# Patient Record
Sex: FEMALE | Race: BLACK OR AFRICAN AMERICAN | NOT HISPANIC OR LATINO | Employment: PART TIME | URBAN - METROPOLITAN AREA
[De-identification: names, ages, dates, MRNs, and addresses within clinical notes are randomized per-mention and may not be internally consistent; named-entity substitution may affect disease eponyms.]

---

## 2017-01-07 ENCOUNTER — ALLSCRIPTS OFFICE VISIT (OUTPATIENT)
Dept: OTHER | Facility: OTHER | Age: 27
End: 2017-01-07

## 2017-01-09 ENCOUNTER — GENERIC CONVERSION - ENCOUNTER (OUTPATIENT)
Dept: OTHER | Facility: OTHER | Age: 27
End: 2017-01-09

## 2017-01-09 LAB
CULTURE RESULT (HISTORICAL): NORMAL
MISCELLANEOUS LAB TEST RESULT (HISTORICAL): NORMAL

## 2017-01-27 ENCOUNTER — ALLSCRIPTS OFFICE VISIT (OUTPATIENT)
Dept: OTHER | Facility: OTHER | Age: 27
End: 2017-01-27

## 2017-01-27 DIAGNOSIS — M54.9 DORSALGIA: ICD-10-CM

## 2017-03-10 ENCOUNTER — GENERIC CONVERSION - ENCOUNTER (OUTPATIENT)
Dept: OTHER | Facility: OTHER | Age: 27
End: 2017-03-10

## 2017-03-10 ENCOUNTER — ALLSCRIPTS OFFICE VISIT (OUTPATIENT)
Dept: OTHER | Facility: OTHER | Age: 27
End: 2017-03-10

## 2017-03-15 LAB
ADEQUACY: (HISTORICAL): NORMAL
CLINICIAN PROVIDIED ICD 9 OR 10 (HISTORICAL): NORMAL
COMMENT (HISTORICAL): NORMAL
DIAGNOSIS (HISTORICAL): NORMAL
HPV HIGH RISK (HISTORICAL): NEGATIVE
NOTE: (HISTORICAL): NORMAL
PERFORMED BY (HISTORICAL): NORMAL

## 2017-03-17 ENCOUNTER — GENERIC CONVERSION - ENCOUNTER (OUTPATIENT)
Dept: OTHER | Facility: OTHER | Age: 27
End: 2017-03-17

## 2017-12-05 ENCOUNTER — GENERIC CONVERSION - ENCOUNTER (OUTPATIENT)
Dept: OTHER | Facility: OTHER | Age: 27
End: 2017-12-05

## 2017-12-05 ENCOUNTER — ALLSCRIPTS OFFICE VISIT (OUTPATIENT)
Dept: OTHER | Facility: OTHER | Age: 27
End: 2017-12-05

## 2017-12-15 ENCOUNTER — GENERIC CONVERSION - ENCOUNTER (OUTPATIENT)
Dept: OTHER | Facility: OTHER | Age: 27
End: 2017-12-15

## 2018-01-11 NOTE — RESULT NOTES
Verified Results  (1) THROAT CULTURE (CULTURE, UPPER RESPIRATORY) [id number] 12:00AM Rolly Jones     Test Name Result Flag Reference   Upper Respiratory Culture Final report     Result 1 Comment     Routine respiratory chelita

## 2018-01-12 VITALS
SYSTOLIC BLOOD PRESSURE: 120 MMHG | HEIGHT: 64 IN | DIASTOLIC BLOOD PRESSURE: 76 MMHG | TEMPERATURE: 98 F | WEIGHT: 155.8 LBS | BODY MASS INDEX: 26.6 KG/M2 | HEART RATE: 64 BPM

## 2018-01-14 VITALS
DIASTOLIC BLOOD PRESSURE: 78 MMHG | BODY MASS INDEX: 26.12 KG/M2 | SYSTOLIC BLOOD PRESSURE: 116 MMHG | HEIGHT: 64 IN | WEIGHT: 153 LBS

## 2018-01-14 NOTE — PROGRESS NOTES
Assessment    1  Environmental allergies (V15 09) (Z91 09)   2  Thyroid enlargement (240 9) (E04 9)   3  Encounter for preventive health examination (V70 0) (Z00 00)   4  Paresthesia (782 0) (R20 2)    Plan  Environmental allergies    · Fluticasone Propionate 50 MCG/ACT Nasal Suspension; 2  spray inhale in each  nostril daily   · Montelukast Sodium 10 MG Oral Tablet (Singulair); 1 every day  Health Maintenance, Environmental allergies, Thyroid enlargement    · (1) CBC/PLT/DIFF; Status:Active; Requested for:29Apr2016;    · (1) COMPREHENSIVE METABOLIC PANEL; Status:Active; Requested for:29Apr2016;    · (1) T3 TOTAL; Status:Active; Requested for:29Apr2016;    · (1) T4, FREE; Status:Active; Requested for:29Apr2016;    · (1) TSH; Status:Active; Requested for:29Apr2016;   Paresthesia    · *(Q) VITAMIN D, 25-HYDROXY, LC/MS/MS; Status:Active; Requested for:29Apr2016; Thyroid enlargement    · US THYROID; Status:Hold For - Scheduling; Requested for:29Apr2016;     Discussion/Summary  health maintenance visit healthy adult female Currently, she eats a healthy diet and has an adequate exercise regimen  cervical cancer screening is current Breast cancer screening: monthly self breast exam was advised  - Generally healthy 63-year-old female  - Mild diffuse enlargement of thyroid gland  No discrete thyroid nodules palpated  Patient will be sent for thyroid ultrasound  -Patient does have transient paresthesias of her fingers which may be an element of carpal tunnel syndrome  Will check thyroid function studies  -Patient given order for screening CBC, CMP, TSH, lipid profile and vitamin D level  -Patient does have environmental allergies  She was given a prescription for Singulair 10 mg once daily and Flonase nasal spray  - We will send  with results of lab studies and thyroid ultrasound barring any significant abnormalities  Possible side effects of new medications were reviewed with the patient/guardian today     The patient was counseled regarding instructions for management  Chief Complaint  NP Preventative visit  History of Present Illness  HM, Adult Female: The patient is being seen for a health maintenance evaluation  General Health: The patient's health since the last visit is described as good  She has regular dental visits  She denies vision problems  She denies hearing loss  Immunizations status: up to date  Lifestyle:  She consumes a diverse and healthy diet  She does not have any weight concerns  She exercises regularly  She does not use tobacco  She denies alcohol use  She denies drug use  Reproductive health:  she reports normal menses  Screening: cancer screening reviewed and current  metabolic screening reviewed and current  risk screening reviewed and current  HPI: 63-year-old -American female presents as a new patient to the practice to establish care  Patient does complain of intermittent episodes of paresthesias in her index and middle finger of both hands as well as on her toes  She is generally in good health  Patient does complain of nasal allergy symptoms  She recently moved to the area from [place]  She will likely be moving down to the [place] area as her  works at the [employer]  Review of Systems    Constitutional: No fever, no chills, feels well, no tiredness, no recent weight gain or weight loss  Eyes: No complaints of eye pain, no red eyes, no eyesight problems, no discharge, no dry eyes, no itching of eyes  ENT: nasal discharge  Cardiovascular: No complaints of slow heart rate, no fast heart rate, no chest pain, no palpitations, no leg claudication, no lower extremity edema  Respiratory: No complaints of shortness of breath, no wheezing, no cough, no SOB on exertion, no orthopnea, no PND  Gastrointestinal: No complaints of abdominal pain, no constipation, no nausea or vomiting, no diarrhea, no bloody stools     Genitourinary: No complaints of dysuria, no incontinence, no pelvic pain, no dysmenorrhea, no vaginal discharge or bleeding  Musculoskeletal: No complaints of arthralgias, no myalgias, no joint swelling or stiffness, no limb pain or swelling  Integumentary: No complaints of skin rash or lesions, no itching, no skin wounds, no breast pain or lump  Neurological: tingling, but as noted in HPI  Psychiatric: Not suicidal, no sleep disturbance, no anxiety or depression, no change in personality, no emotional problems  Endocrine: No complaints of proptosis, no hot flashes, no muscle weakness, no deepening of the voice, no feelings of weakness  Hematologic/Lymphatic: No complaints of swollen glands, no swollen glands in the neck, does not bleed easily, does not bruise easily  Family History  Mother    · Family history of hypertension (V17 49) (Z82 49)    Social History    · Caffeine use (V49 89) (F15 90)   · Coffee   ·    · Never smoker   · Occupation   ·    · One child    Current Meds   1  No Reported Medications Recorded    Allergies    1  No Known Drug Allergies    Vitals   Recorded: 29Apr2016 11:10AM   Heart Rate 68   Respiration 14   Systolic 340   Diastolic 72   Height 5 ft 4 in   Weight 154 lb    BMI Calculated 26 43   BSA Calculated 1 76     Physical Exam    Constitutional   General appearance: No acute distress, well appearing and well nourished  Eyes   Conjunctiva and lids: No swelling, erythema or discharge  Pupils and irises: Equal, round, reactive to light  Ophthalmoscopic examination: Normal fundi and optic discs  Ears, Nose, Mouth, and Throat   External inspection of ears and nose: Normal     Otoscopic examination: Tympanic membranes translucent with normal light reflex  Canals patent without erythema  Hearing: Normal     Nasal mucosa, septum, and turbinates: Abnormal   There was clear rhinorrhea from both nares  The bilateral nasal mucosa was boggy, edematous and red  Lips, teeth, and gums: Normal, good dentition  Oropharynx: Normal with no erythema, edema, exudate or lesions  Neck   Neck: Supple, symmetric, trachea midline, no masses  Thyroid: Abnormal   The thyroid was diffusely enlarged  Pulmonary   Respiratory effort: No increased work of breathing or signs of respiratory distress  Percussion of chest: Normal     Palpation of chest: Normal     Auscultation of lungs: Clear to auscultation  Cardiovascular   Palpation of heart: Normal PMI, no thrills  Auscultation of heart: Normal rate and rhythm, normal S1 and S2, no murmurs  Carotid pulses: 2+ bilaterally  Abdominal aorta: Normal     Femoral pulses: 2+ bilaterally  Pedal pulses: 2+ bilaterally  Examination of extremities for edema and/or varicosities: Normal     Abdomen   Abdomen: Non-tender, no masses  Liver and spleen: No hepatomegaly or splenomegaly  Examination for hernias: No hernia appreciated  Lymphatic   Palpation of lymph nodes in neck: No lymphadenopathy  Palpation of lymph nodes in axillae: No lymphadenopathy  Palpation of lymph nodes in groin: No lymphadenopathy  Palpation of lymph nodes in other areas: No lymphadenopathy  Musculoskeletal   Gait and station: Normal     Digits and nails: Normal without clubbing or cyanosis  Joints, bones, and muscles: Normal     Range of motion: Normal     Stability: Normal     Muscle strength/tone: Normal     Skin   Skin and subcutaneous tissue: Normal without rashes or lesions  Palpation of skin and subcutaneous tissue: Normal turgor  Neurologic   Cranial nerves: Cranial nerves II-XII intact  Reflexes: 2+ and symmetric  Sensation: No sensory loss  Psychiatric   Judgment and insight: Normal     Orientation to person, place, and time: Normal     Recent and remote memory: Intact  Mood and affect: Normal        Signatures   Electronically signed by : Leah Howell DO;  Apr 29 2016  3:47PM EST (Author)

## 2018-01-15 NOTE — PROGRESS NOTES
Assessment    1  Encounter for gynecological examination without abnormal finding (V72 31) (Z01 419)   2  Allergic rhinitis (477 9) (J30 9)    Plan   Allergic rhinitis    · Fexofenadine HCl - 180 MG Oral Tablet; TAKE 1 TABLET DAILY AS NEEDED FOR  ALLERGIES   Rx By: Moreno Bailey; Dispense: 30 Days ; #:30 Tablet; Refill: 2; For: Allergic rhinitis; RAYMUNDO = N; Verified Transmission to [facility]; Last Updated By: System, [employer]; 3/10/2017 10:47:51 AM   · Fluticasone Propionate 50 MCG/ACT Nasal Suspension; 2 sprays in each nostril at  bedtime   Rx By: Moreno Bailey; Dispense: 0 Days ; #:1 X 16 GM Bottle; Refill: 3; For: Allergic rhinitis; RAYMUNDO = N; Verified Transmission to [facility]; Last Updated By: System, [employer]; 3/10/2017 10:47:52 AM  Encounter for gynecological examination without abnormal finding    · Drink plenty of fluids ; Status:Complete;   Done: 05SJR7737   Ordered;  For:Encounter for gynecological examination without abnormal finding; Ordered By:Anna Nickerson;   · Eat a normal well-balanced diet ; Status:Complete;   Done: 12OHJ3524   Ordered;  For:Encounter for gynecological examination without abnormal finding; Ordered By:Anna Nickerson;   · Eat foods that are high in calcium ; Status:Complete;   Done: 60ZEA1199   Ordered;  For:Encounter for gynecological examination without abnormal finding; Ordered By:Anna Nickerson;   · There are many ways to reduce your risk of catching or spreading a sexually transmitted  Infection ; Status:Complete;   Done: 70RGW5176   Ordered;  Monet Eduardo for gynecological examination without abnormal finding; Ordered By:Anna Nickerson;   · Use a sun block product with an SPF of 15 or more ; Status:Complete;   Done:  74ZLJ5612   Ordered;  For:Encounter for gynecological examination without abnormal finding; Ordered By:Anna Nickerson;   · Using a latex condom can help prevent pregnancy   It can also help to prevent the spread  of sexually transmitted infections ; Status:Complete;   Done: 35GWL2584   Ordered;  For:Encounter for gynecological examination without abnormal finding; Ordered By:Jenny Nickerson;   · Vitamins can help you get daily requirements that your diet may not be giving you ;  Status:Complete;   Done: 36ICP4708   Ordered;  For:Encounter for gynecological examination without abnormal finding; Ordered By:Jenny Nickerson;   · We encourage all of our patients to exercise regularly  30 minutes of exercise or physical  activity five or more days a week is recommended for children and adults ;  Status:Complete;   Done: 55LHW2831   Ordered;  For:Encounter for gynecological examination without abnormal finding; Ordered By:Jenny Nickerson;   · We recommend that you follow these steps to lower your risk of osteoporosis  ;  Status:Complete;   Done: 62KUQ3237   Ordered;  For:Encounter for gynecological examination without abnormal finding; Ordered By:Jenny Nickerson;   · Call [phone] if: You find a new or different kind of lump in your breast ;  Status:Complete;   Done: 09SER7803   Ordered;  For:Encounter for gynecological examination without abnormal finding; Ordered By:Jenny Nickerson;   · Call [phone] if: You have any warning signs of skin cancer ; Status:Complete;    Done: 45DDM4872   Ordered;  For:Encounter for gynecological examination without abnormal finding; Ordered By:Jenny Nickerson;   · (1) THIN PREP PAP WITH IMAGING; Status:Active; Requested for:10Mar2017;    Perform:[facility]; Due:10Mar2018; Ordered;  For:Encounter for gynecological examination without abnormal finding; Ordered By:Jenny Nickerson; Maturation index required? : No  HPV? : Regardless of Interpretation    Follow-up visit in 1 year Evaluation and Treatment  Follow-up  Status: Hold For - Scheduling  Requested for: 85OXE5390  Ordered;    For: Encounter for gynecological examination without abnormal finding;  Ordered By: Cristian Ocasio  Performed: Due: 67PHW1599     Discussion/Summary  healthy adult female the risks and benefits of cervical cancer screening were discussed HPV and Pap Co-testing Done Today Breast cancer screening: the risks and benefits of breast cancer screening were discussed, self breast exam technique was taught, monthly self breast exam was advised and the next mammogram is due at age 36  Colorectal cancer screening: colorectal cancer screening is not indicated  Osteoporosis screening: bone mineral density testing is not indicated  Advice and education were given regarding reproductive health and contraception  Patient discussion: discussed with the patient  Gyn exam w/pap and HPV updated today  If HPV negative, next can be done in 3 years  Return in 1 year for CBE/pelvic exam    Scripts issued for antihistamine and nasal steroid as pt requested for upcoming spring allergy season  Patient is able to Self-Care  The treatment plan was reviewed with the patient/guardian  The patient/guardian understands and agrees with the treatment plan      Chief Complaint  gyn exam        History of Present Illness  HPI: Here to update gyn visit today  Last gyn visit was about a year ago in [place]  No hx abnormals  Denies gyn questions/concerns today  PMH - vaginosis when pregnant  PSH- no gyn surgeries  FH - maternal great aunt, breast cancer in 68s-80  pregnancy - vaginal birth 1 (son Diego Hudson), P4 - 2 other vaginal births but wishes to not discuss, is not raising those children  FDLMP - 3/3/17, regular monthly menses, 1 heavy day and spotting x4 days  birth control - nothing, off OCPs for few months     GYN HM, Adult Female [facility]: The patient is being seen for a gynecology evaluation  The last health maintenance visit was 1 year(s) ago  Social History: Household members include spouse and 1 son(s)  She is   Work status: working part-time and occupation: sales at [employer]   General Health:  The patient's health since the last visit is described as good  Reproductive health: the patient is premenopausal   she reports no menstrual problems  she uses no contraception  she is sexually active  She is monogamous with a male partner  pregnancy history: G 3P 3  Screening: Cervical cancer screening includes a pap smear performed @1 year ago  and uncertain timing of her last human papilloma virus screening  Breast cancer screening includes no previous mammogram and monthly breast self-exams performed  Colorectal cancer screening includes no previous colonoscopy  Review of Systems  no pelvic pain, no pelvic pressure, no vaginal pain, no vaginal discharge, did not miss the most recent period, no dysmenorrhea, no dysuria and no urinary loss of control  Constitutional: not feeling poorly  Breasts: no breast pain and no breast lump  Gastrointestinal: no constipation and no blood in stools  Genitourinary: as noted in HPI  OB History  Pregnancy History (Brief):   Delivery type: 3 vaginal       Active Problems    1  Adenitis (289 3) (I88 9)   2  Chronic back pain (724 5,338 29) (M54 9,G89 29)   3  Environmental allergies (V15 09) (Z91 09)   4  Thyroid enlargement (240 9) (E01 0)   5  Vitamin D insufficiency (268 9) (E55 9)    Past Medical History    · History of Acute infective pharyngitis (462) (J02 9)   · History of Contraceptive management (V25 9) (Z30 9)   · History of lymphadenopathy (V13 89) (T05 408)   · History of Paresthesia (782 0) (R20 2)   · History of Sorethroat (462) (J02 9)    The active problems and past medical history were reviewed and updated today  Family History  Mother    · Family history of hypertension (V17 49) (Z82 49)    The family history was reviewed and updated today  Social History    · Caffeine use (V49 89) (F15 90)   · Coffee   ·    · Never smoker   · Occupation   ·    · One child  The social history was reviewed and updated today  Current Meds   1   Vitamin D3 1000 UNIT Oral Tablet; TAKE 1 TABLET DAILY; Therapy: 97SOX8726 to (Evaluate:03Svv3306) Recorded    Allergies    1  No Known Drug Allergies    Vitals   Recorded: 02HSS3331 10:21AM   Temperature 97 3 F   Heart Rate 84   Systolic 852   Diastolic 80   Height 5 ft 4 in   Weight 156 lb 9 6 oz   BMI Calculated 26 88   BSA Calculated 1 77     Physical Exam    Constitutional   General appearance: No acute distress, well appearing and well nourished  Genitourinary   External genitalia: Normal and no lesions appreciated  Vagina: Normal, no lesions or dryness appreciated  Cervix: Normal, no palpable masses  Examination of the cervix revealed a barrel-shaped cervix  A Pap smear was performed  Bimanual exam findings include no cervical motion tenderness  Uterus: Normal, non-tender, not enlarged, and no palpable masses  Adnexa/parametria: Normal, non-tender and no fullness or masses appreciated  Chest   Breasts: Normal and no dimpling or skin changes noted  Lymphatic   Palpation of lymph nodes in neck, axillae, groin and/or other locations: No lymphadenopathy or masses noted  Psychiatric   Mood and affect: Normal        Attending Note  Collaborating Physician Note: Collaborating Note: I agree with the Advanced Practitioner note  Future Appointments    Date/Time Provider Specialty Site   03/13/2018 11:00 AM Bora Nickerson MD     Signatures   Electronically signed by :  AIDA Cotto; Mar 10 2017  1:13PM EST                       (Author)    Electronically signed by : Ursula Murry MD; Mar 10 2017  2:33PM EST                       (Co-author)

## 2018-01-15 NOTE — RESULT NOTES
Message   Please notify patient that her pap is normal w/negative HPV so next can be obtained in 3 years  Verified Results  ([facility]) Pap IG, HPV-hr [id number] 12:00AM Sadie Fisher     Test Name Result Flag Reference   DIAGNOSIS: Comment     NEGATIVE FOR INTRAEPITHELIAL LESION AND MALIGNANCY  Specimen adequacy: Comment     Satisfactory for evaluation  Endocervical and/or squamous metaplastic  cells (endocervical component) are present  Clinician provided ICD10: Comment     Z01 419   Performed by: Gretel Young Cytotechnologist       Note: Comment     The Pap smear is a screening test designed to aid in the detection of  premalignant and malignant conditions of the uterine cervix  It is not a  diagnostic procedure and should not be used as the sole means of detecting  cervical cancer  Both false-positive and false-negative reports do occur  HPV, high-risk Negative  Negative   This high-risk HPV test detects thirteen high-risk types  (16/18/31/33/35/39/45/51/52/56/58/59/68) without differentiation            Patient notified of results

## 2018-01-16 ENCOUNTER — ALLSCRIPTS OFFICE VISIT (OUTPATIENT)
Dept: OTHER | Facility: OTHER | Age: 28
End: 2018-01-16

## 2018-01-18 NOTE — RESULT NOTES
Verified Results  US THYROID [id number] 04:42PM Luis Krueger Order Number: [id number]     Test Name Result Flag Reference   US THYROID (Report)     THYROID ULTRASOUND     INDICATION: Enlarged thyroid gland on clinical exam  Family history of heart carcinoma  COMPARISON: None  TECHNIQUE:  Ultrasound of the thyroid was performed with a high frequency linear transducer in transverse and sagittal planes including volumetric imaging sweeps as well as traditional still imaging technique  FINDINGS:   Normal homogeneous smooth echotexture  Right gland: 4 8 x 1 5 x 1 9 cm  No dominant nodules  Left gland: 3 8 x 1 2 x 1 6 cm  No dominant nodules  Isthmus: 0 2 cm in AP dimension  No dominant nodules  IMPRESSION:      Normal        Workstation performed: [id number]     Signed by:   Ladonna Anderson MD   5/13/16       Discussion/Summary   Ultrasound of the thyroid gland is completely normal  No thyroid nodules are noted  Please have labs completed as ordered

## 2018-01-22 VITALS
TEMPERATURE: 97.3 F | DIASTOLIC BLOOD PRESSURE: 80 MMHG | HEIGHT: 64 IN | BODY MASS INDEX: 26.73 KG/M2 | SYSTOLIC BLOOD PRESSURE: 110 MMHG | WEIGHT: 156.6 LBS | HEART RATE: 84 BPM

## 2018-01-23 ENCOUNTER — ALLSCRIPTS OFFICE VISIT (OUTPATIENT)
Dept: OTHER | Facility: OTHER | Age: 28
End: 2018-01-23

## 2018-01-23 NOTE — MISCELLANEOUS
Provider Comments  Provider Comments:   N/S FOR APT      Signatures   Electronically signed by : GUILLERMO Little ; Jan 16 2018  1:05PM EST                       (Author)

## 2018-01-24 VITALS
BODY MASS INDEX: 27.86 KG/M2 | DIASTOLIC BLOOD PRESSURE: 70 MMHG | WEIGHT: 157.25 LBS | HEIGHT: 63 IN | SYSTOLIC BLOOD PRESSURE: 118 MMHG

## 2018-01-24 VITALS
HEIGHT: 64 IN | WEIGHT: 155.25 LBS | SYSTOLIC BLOOD PRESSURE: 110 MMHG | BODY MASS INDEX: 26.5 KG/M2 | DIASTOLIC BLOOD PRESSURE: 60 MMHG

## 2018-01-24 NOTE — MISCELLANEOUS
Message  Return to work or school:   Kaleigh Wei is under my professional care  She was seen in my office on 1/23/18        Luis Lawler        Signatures   Electronically signed by : Luis Lawler; Jan 23 2018  3:03PM EST                       (Author)

## 2018-01-24 NOTE — PROGRESS NOTES
Assessment   1  Otitis externa of left ear (380 10) (H60 92)   2  Pregnancy (V22 2) (Z34 90)    Plan   Otitis externa of left ear    · Amoxicillin 500 MG Oral Capsule; TAKE 2 CAPSULES TWICE DAILY UNTIL GONE    Discussion/Summary      Remains with otitis externa despite drops  trial oral antibiotic   exam does not match amount of pain pt is having  Concern for possible neuralgia  pt to call with no improvement and will have her see ENT  Possible side effects of new medications were reviewed with the patient/guardian today  The treatment plan was reviewed with the patient/guardian  The patient/guardian understands and agrees with the treatment plan      Chief Complaint   Pt is here with c/o ear pain  History of Present Illness   HPI: Left ear pain that radiates down face  thinks it started after using a Q-tip  Was seen at [facility] and diagnosed with outer ear infection  using antibiotic eardrops  Pain is getting worse  taking Tylenol which helps but when wears off gets very bad  Denies nasal congestion and runny nose  Denies sore throat  4 months pregnant  Review of Systems        Constitutional: as noted in HPI       ENT: as noted in HPI  Active Problems   1  Adenitis (289 3) (I88 9)   2  Allergic rhinitis (477 9) (J30 9)   3   screening encounter (V28 9) (Z36 9)   4  Chronic back pain (724 5,338 29) (M54 9,G89 29)   5  Encounter for gynecological examination without abnormal finding (V72 31) (Z01 419)   6  Environmental allergies (V15 09) (Z91 09)   7  Pregnancy (V22 2) (Z34 90)   8  Thyroid enlargement (240 9) (E04 9)   9  Vitamin D insufficiency (268 9) (E55 9)    Past Medical History   1  History of Acute infective pharyngitis (462) (J02 9)   2  History of Contraceptive management (V25 9) (Z30 9)   3  History of lymphadenopathy (V13 89) (Z87 898)   4  History of Paresthesia (782 0) (R20 2)   5  History of Sorethroat (12) (J02 9)    Family History   Mother    1   Family history of hypertension (V17 49) (Z82 49)  Father    2  Family history of hypertension (V17 49) (Z82 49)    Social History    · Caffeine use (V49 89) (F15 90)   · Coffee   ·    · Never smoker   · Occupation   ·    · One child  The social history was reviewed and updated today  The social history was reviewed and is unchanged  Current Meds    1  CitraNatal 90 DHA 90-1 & 300 MG Oral Miscellaneous; Sig one daily; Therapy: 91GZW2022 to (Evaluate:68Grd6038)  Requested for: 12OYF1771; Last     Rx:[id number] Ordered   2  Prenatal Multivitamin + DHA MISC; Therapy: (Recorded:34Bxi2462) to Recorded     The medication list was reviewed and updated today  Allergies   1  No Known Drug Allergies    Vitals    Recorded: 96JGH1823 02:41PM   Temperature 97 9 F, Tympanic   Heart Rate 80, L Radial   Pulse Quality Regular, L Radial   Systolic 881, LUE, Sitting   Diastolic 60, LUE, Sitting   Height 5 ft 3 in   Weight 163 lb    BMI Calculated 28 87   BSA Calculated 1 77     Physical Exam        Constitutional      General appearance: No acute distress, well appearing and well nourished  Ears, Nose, Mouth, and Throat      External inspection of ears and nose: Abnormal  -- Tenderness noted left tragus  No mastoid tenderness  Otoscopic examination: Abnormal   The right tympanic membrane was normal  The left tympanic membrane was bulging,-- had a loss of landmarks-- and-- had a diminished light reflex, but-- was not red  The left external canal was tender,-- was swollen-- and-- was erythematous  -- No TM perforation noted  Oropharynx: Normal with no erythema, edema, exudate or lesions  Pulmonary      Respiratory effort: No increased work of breathing or signs of respiratory distress  Auscultation of lungs: Clear to auscultation  Cardiovascular      Auscultation of heart: Normal rate and rhythm, normal S1 and S2, without murmurs         Lymphatic      Palpation of lymph nodes in neck: Abnormal   no anterior cervical node enlargement,-- no posterior cervical node enlargement,-- no submandibular node enlargement-- and-- no supraclavicular node enlargement  -- 2 mm post auricular node palpated on the left        Psychiatric      Orientation to person, place, and time: Normal        Mood and affect: Normal           Future Appointments      Date/Time Provider Specialty Site   03/13/2018 11:00 AM Judson Velasco, [facility] Milind العراقي MD     Signatures    Electronically signed by : Luis Shelton [facility]; Jan 23 2018  4:39PM EST                       (Author)     Electronically signed by : Serina Aguero DO; Jan 23 2018  5:06PM EST                       (Author)

## 2018-01-26 LAB
EXTERNAL ABO GROUPING: NORMAL
EXTERNAL ANTIBODY SCREEN: NORMAL
EXTERNAL HEMATOCRIT: 32.8 %
EXTERNAL HEMOGLOBIN: 11 G/DL
EXTERNAL HEPATITIS B SURFACE ANTIGEN: NEGATIVE
EXTERNAL HIV-1 ANTIBODY: NORMAL
EXTERNAL PLATELET COUNT: 263 K/ΜL
EXTERNAL RH FACTOR: NEGATIVE
EXTERNAL RUBELLA IGG QUANTITATION: NORMAL
EXTERNAL SYPHILIS RPR SCREEN: NORMAL

## 2018-02-02 LAB
2ND TRIMESTER 4 SCREEN SERPL-IMP: NORMAL
2ND TRIMESTER 4 SCREEN SERPL-IMP: NORMAL
ABO GROUP BLD: (no result)
AFP ADJ MOM SERPL: 0.78
AFP SERPL-MCNC: 31.7 NG/ML
AGE AT DELIVERY: 28.3 YEARS
BASOPHILS # BLD AUTO: 0 X10E3/UL (ref 0–0.2)
BASOPHILS NFR BLD AUTO: 0 %
BLD GP AB SCN SERPL QL: NEGATIVE
EOSINOPHIL # BLD AUTO: 0.1 X10E3/UL (ref 0–0.4)
EOSINOPHIL NFR BLD AUTO: 2 %
ERYTHROCYTE [DISTWIDTH] IN BLOOD BY AUTOMATED COUNT: 13.5 % (ref 12.3–15.4)
FET TS 18 RISK FROM MAT AGE: NORMAL
FET TS 21 RISK FROM MAT AGE: 835
GA METHOD: NORMAL
GA: 17.3 WEEKS
HBV SURFACE AB SER QL: REACTIVE
HBV SURFACE AG SERPL QL IA: NEGATIVE
HCG ADJ MOM SERPL: 0.96
HCG SERPL-ACNC: NORMAL MIU/ML
HCT VFR BLD AUTO: 32.8 % (ref 34–46.6)
HCV AB S/CO SERPL IA: 0.1 S/CO RATIO (ref 0–0.9)
HGB BLD-MCNC: 11 G/DL (ref 11.1–15.9)
HIV 1+2 AB+HIV1 P24 AG SERPL QL IA: NON REACTIVE
IDDM PATIENT QL: NO
IMM GRANULOCYTES # BLD: 0 X10E3/UL (ref 0–0.1)
IMM GRANULOCYTES NFR BLD: 0 %
INHIBIN A ADJ MOM SERPL: 0.95
INHIBIN A SERPL-MCNC: 155.79 PG/ML
KARYOTYP BLD/T: NORMAL
LYMPHOCYTES # BLD AUTO: 2.1 X10E3/UL (ref 0.7–3.1)
LYMPHOCYTES NFR BLD AUTO: 26 %
MCH RBC QN AUTO: 30.9 PG (ref 26.6–33)
MCHC RBC AUTO-ENTMCNC: 33.5 G/DL (ref 31.5–35.7)
MCV RBC AUTO: 92 FL (ref 79–97)
MONOCYTES # BLD AUTO: 0.6 X10E3/UL (ref 0.1–0.9)
MONOCYTES NFR BLD AUTO: 8 %
MULTIPLE PREGNANCY: NO
NEURAL TUBE DEFECT RISK FETUS: NORMAL %
NEUTROPHILS # BLD AUTO: 5.1 X10E3/UL (ref 1.4–7)
NEUTROPHILS NFR BLD AUTO: 64 %
PLATELET # BLD AUTO: 263 X10E3/UL (ref 150–379)
RBC # BLD AUTO: 3.56 X10E6/UL (ref 3.77–5.28)
RH BLD: NEGATIVE
RPR SER QL: NON REACTIVE
RUBV IGG SERPL IA-ACNC: 8.59 INDEX
SERVICE CMNT-IMP: NORMAL
TS 18 RISK FETUS: NORMAL
TS 21 RISK FETUS: 5271
U ESTRIOL ADJ MOM SERPL: 1.25
U ESTRIOL SERPL-MCNC: 1.38 NG/ML
WBC # BLD AUTO: 7.9 X10E3/UL (ref 3.4–10.8)

## 2018-02-13 ENCOUNTER — OB ABSTRACT (OUTPATIENT)
Dept: OBGYN CLINIC | Facility: CLINIC | Age: 28
End: 2018-02-13

## 2018-02-13 LAB — CYTO CVX: NORMAL

## 2018-03-07 NOTE — PROGRESS NOTES
Education  Baby & Me Education 1st Trimester:   First Trimester Education provided:   benefits of breastfeeding, importance of exclusive breastfeeding, early initiation of breastfeeding, exclusive breastfeeding for the first 6 months and Pregnancy Essentials Reference Guide given   The patient is planning on breastfeeding     Prenatal education provided by: Dariel Reveles RN      Signatures   Electronically signed by : Dariel Reveles RN; Dec  5 2017 11:40AM EST                       (Author)

## 2018-03-09 ENCOUNTER — TELEPHONE (OUTPATIENT)
Dept: OBGYN CLINIC | Facility: CLINIC | Age: 28
End: 2018-03-09

## 2018-03-09 PROBLEM — E55.9 VITAMIN D INSUFFICIENCY: Status: ACTIVE | Noted: 2017-01-27

## 2018-03-09 PROBLEM — G89.29 CHRONIC BACK PAIN: Status: ACTIVE | Noted: 2017-01-27

## 2018-03-09 PROBLEM — J30.9 ALLERGIC RHINITIS: Status: ACTIVE | Noted: 2017-03-10

## 2018-03-09 PROBLEM — M54.9 CHRONIC BACK PAIN: Status: ACTIVE | Noted: 2017-01-27

## 2018-03-09 NOTE — TELEPHONE ENCOUNTER
Pt informed lab results received here from 1/26/18 - init labs & quad screen - spoke with pt who has transferred her ob care to [facility]  Pt states they have results    Pt will  copy of pap results from 3/2017 per her request

## 2019-08-26 ENCOUNTER — TELEPHONE (OUTPATIENT)
Dept: FAMILY MEDICINE CLINIC | Facility: HOSPITAL | Age: 29
End: 2019-08-26

## 2019-09-16 ENCOUNTER — TELEPHONE (OUTPATIENT)
Dept: FAMILY MEDICINE CLINIC | Facility: HOSPITAL | Age: 29
End: 2019-09-16

## 2019-09-16 NOTE — TELEPHONE ENCOUNTER
Patient overdue for physical  Spoke to patient  She said she is healthy and declined to make an appt at this time

## 2020-09-06 ENCOUNTER — NURSE TRIAGE (OUTPATIENT)
Dept: OTHER | Facility: OTHER | Age: 30
End: 2020-09-06

## 2020-09-06 NOTE — TELEPHONE ENCOUNTER
Regarding: Fever, Nausea, Sore throat/COVID   ----- Message from Parish Bourgeois sent at 9/6/2020  3:50 PM EDT -----   called in stated PT  Has a fever, sore throat, body aches, Mild cough, nausea not sure if its a cold or should be tested for COVID

## 2020-09-07 NOTE — TELEPHONE ENCOUNTER
Regarding: COVID - 1 of 2   ----- Message from Vianey Adan sent at 9/6/2020  7:57 PM EDT -----   calling back regarding wife see previous message:     " called in stated PT  Has a fever, sore throat, body aches, Mild cough, nausea not sure if its a cold or should be tested for COVID"

## 2020-09-07 NOTE — TELEPHONE ENCOUNTER
PATIENTS  CALLED IN AND STATES HIS WIFE HAS AFEVER COUGH, SORE THROAT , BODY ACHES AND REQUEST GUIDANCE  OFFERED TO SET UP A VIRTUAL APPOINTMENT BUT HE PREFERS TESTING TO BE DONE NOW AND WILL TAKE HIS WIFE TO A [facility]

## 2020-09-07 NOTE — TELEPHONE ENCOUNTER
Reason for Disposition   [1] COVID-19 infection diagnosed or suspected AND [2] mild symptoms (fever, cough) AND [3] no trouble breathing or other complications    Additional Information   [1] COVID-19 suspected (e g , cough, fever, shortness of breath) AND [2] mild symptoms AND [0] [facility] department recommends testing    Answer Assessment - Initial Assessment Questions  1  COVID-19 DIAGNOSIS: "Who made your Coronavirus (COVID-19) diagnosis?" "Was it confirmed by a positive lab test?" If not diagnosed by a HCP, ask "Are there lots of cases (community spread) where you live?" (See public health department website, if unsure)    * MAJOR community spread: high number of cases; numbers of cases are increasing; many people hospitalized  * MINOR community spread: low number of cases; not increasing; few or no people hospitalized     YES  2  ONSET: "When did the COVID-19 symptoms start?"       [address]  3  WORST SYMPTOM: "What is your worst symptom?" (e g , cough, fever, shortness of breath, muscle aches)      FEVER 102 5 SORE THROAT COUGH  4  COUGH: "Do you have a cough?" If so, ask: "How bad is the cough?"        YES    5  FEVER: "Do you have a fever?" If so, ask: "What is your temperature, how was it measured, and when did it start?"      102 5  6  RESPIRATORY STATUS: "Describe your breathing?" (e g , shortness of breath, wheezing, unable to speak)      DENIES  7  BETTER-SAME-WORSE: "Are you getting better, staying the same or getting worse compared to yesterday?"  If getting worse, ask, "In what way?"     WORSE  8  HIGH RISK DISEASE: "Do you have any chronic medical problems?" (e g , asthma, heart or lung disease, weak immune system, etc )     NO  9   PREGNANCY: "Is there any chance you are pregnant?" "When was your last menstrual period?"      NO  10  OTHER SYMPTOMS: "Do you have any other symptoms?"  (e g , runny nose, headache, sore throat, loss of smell)       SORE THROAT BODY ACHES    Protocols used: CORONAVIRUS (VRQIJ-07) - DIAGNOSED OR SUSPECTED-ADULT-AH

## 2020-09-08 DIAGNOSIS — Z20.828 EXPOSURE TO SARS-ASSOCIATED CORONAVIRUS: ICD-10-CM

## 2020-09-08 DIAGNOSIS — Z20.828 EXPOSURE TO SARS-ASSOCIATED CORONAVIRUS: Primary | ICD-10-CM

## 2020-09-08 PROCEDURE — U0003 INFECTIOUS AGENT DETECTION BY NUCLEIC ACID (DNA OR RNA); SEVERE ACUTE RESPIRATORY SYNDROME CORONAVIRUS 2 (SARS-COV-2) (CORONAVIRUS DISEASE [COVID-19]), AMPLIFIED PROBE TECHNIQUE, MAKING USE OF HIGH THROUGHPUT TECHNOLOGIES AS DESCRIBED BY CMS-2020-01-R: HCPCS | Performed by: FAMILY MEDICINE

## 2020-09-08 NOTE — TELEPHONE ENCOUNTER
[facility]    LM to call back  Did she go to urgent care? Did she have Covid testing? What was result of Covid testing?

## 2020-09-08 NOTE — TELEPHONE ENCOUNTER
Spoke to pt states son went to school mon and tues (hybrid) then on wed had runny nose  Few days later mom got sick had one day when she felt really bad and was down and out all day  Then she was fine  Feeling almost completely better now just a slight cough and some phlegm in throat  [facility] were having all pt that needed covid testing in the back building and she didn't feel comfortable being grouped with everyone especially since she was starting to feel better  Family is telling her she needs to be tested  Wants to know if you think testing is necessary?

## 2020-09-09 LAB — SARS-COV-2 RNA SPEC QL NAA+PROBE: NOT DETECTED

## 2021-01-22 ENCOUNTER — TELEPHONE (OUTPATIENT)
Dept: FAMILY MEDICINE CLINIC | Facility: HOSPITAL | Age: 31
End: 2021-01-22

## 2021-01-22 ENCOUNTER — TELEMEDICINE (OUTPATIENT)
Dept: FAMILY MEDICINE CLINIC | Facility: HOSPITAL | Age: 31
End: 2021-01-22
Payer: COMMERCIAL

## 2021-01-22 VITALS — WEIGHT: 170 LBS | BODY MASS INDEX: 30.12 KG/M2 | HEIGHT: 63 IN

## 2021-01-22 DIAGNOSIS — R21 RASH OF BODY: Primary | ICD-10-CM

## 2021-01-22 PROCEDURE — 99213 OFFICE O/P EST LOW 20 MIN: CPT | Performed by: NURSE PRACTITIONER

## 2021-01-22 NOTE — TELEPHONE ENCOUNTER
Spoke to pt, not able to schedule inperson visit until Monday due to son at home with virtual school  She does not want to wait til Monday and insisted on starting with a virtual visit today

## 2021-01-22 NOTE — PROGRESS NOTES
Virtual Regular Visit      Assessment/Plan:    Problem List Items Addressed This Visit     None      Visit Diagnoses     Rash of body    -  Primary    ? cause (viral vs other, doubt scabies), advise she continue cortisone & benadryl prn, schedule in office eval next week             Reason for visit is   Chief Complaint   Patient presents with    Rash    Virtual Regular Visit        Encounter provider AIDA Spence    Provider located at [address] [place] [address] MD  [address] [place] [address]      Recent Visits  No visits were found meeting these conditions  Showing recent visits within past 7 days and meeting all other requirements     Today's Visits  Date Type Provider Dept   01/22/21 Telemedicine [facility], [facility]   01/22/21 Telephone [facility] [place]   Showing today's visits and meeting all other requirements     Future Appointments  No visits were found meeting these conditions  Showing future appointments within next 150 days and meeting all other requirements        The patient was identified by name and date of birth  Lilia Mcdaniel was informed that this is a telemedicine visit and that the visit is being conducted through [employer] [place] [address] and patient was informed that this is not a secure, HIPAA-compliant platform  She agrees to proceed     My office door was closed  No one else was in the room  She acknowledged consent and understanding of privacy and security of the video platform  The patient has agreed to participate and understands they can discontinue the visit at any time  Patient is aware this is a billable service  Subjective  Lilia Mcdaniel is a 27 y o  female w/rash  5 days ago she noticed a few bumps "not many" on stomach  Rash has spread this week across abdomen and onto neck and face  Has a few spots on left arm/elbow  No spots on legs  No itch or redness to bumps   Applied hydrocortisone 1% about an hour ago  Took 2 benadryl about 3 hours ago  Had been using a new detox pill and dc'd day rash started  Feels well otherwise  No change in soaps, lotions, detergents, food  Past Medical History:   Diagnosis Date    Paresthesia     last assessed 4/29/16       History reviewed  No pertinent surgical history  No current outpatient medications on file  No current facility-administered medications for this visit  No Known Allergies    Review of Systems   Constitutional: Negative for chills and fever  Skin: Positive for rash  Video Exam    Vitals:    01/22/21 1247   Weight: 77 1 kg (170 lb)   Height: 5' 3" (1 6 m)       Physical Exam  Vitals signs reviewed  Constitutional:       General: She is not in acute distress  Appearance: Normal appearance  HENT:      Head: Normocephalic and atraumatic  Pulmonary:      Effort: Pulmonary effort is normal  No respiratory distress  Skin:     Comments: Trunk, chin, left arm w/multiple, flesh colored papules w/o vesicles or pustules   Neurological:      General: No focal deficit present  Mental Status: She is alert and oriented to person, place, and time  Psychiatric:         Mood and Affect: Mood normal          Behavior: Behavior normal           I spent 10 minutes with patient today in which greater than 50% of the time was spent in counseling/coordination of care regarding symptoms and plan of care      VIRTUAL VISIT DISCLAIMER    Mariza Barrett acknowledges that she has consented to an online visit or consultation  She understands that the online visit is based solely on information provided by her, and that, in the absence of a face-to-face physical evaluation by the physician, the diagnosis she receives is both limited and provisional in terms of accuracy and completeness  This is not intended to replace a full medical face-to-face evaluation by the physician   Mariza Barrett understands and accepts these terms

## 2021-01-22 NOTE — TELEPHONE ENCOUNTER
Rash noticed Monday  On torso and traveling up towards head  Not red, not itchy  Small bumps similar to goose bump appearance  Has not tried any medication so far  Was recently taking a detox pill, stopped taking on Monday  She is going to try taking benadryl and apply hydrocortisone cream to see if that helps  She did take a video of the rash that she wants to send to you   Would you like to see her in office or virtual

## 2021-01-25 ENCOUNTER — OFFICE VISIT (OUTPATIENT)
Dept: FAMILY MEDICINE CLINIC | Facility: HOSPITAL | Age: 31
End: 2021-01-25
Payer: COMMERCIAL

## 2021-01-25 VITALS
HEART RATE: 82 BPM | TEMPERATURE: 96.6 F | SYSTOLIC BLOOD PRESSURE: 128 MMHG | BODY MASS INDEX: 30.83 KG/M2 | DIASTOLIC BLOOD PRESSURE: 80 MMHG | HEIGHT: 63 IN | WEIGHT: 174 LBS

## 2021-01-25 DIAGNOSIS — R21 RASH OF BODY: Primary | ICD-10-CM

## 2021-01-25 PROCEDURE — 3008F BODY MASS INDEX DOCD: CPT | Performed by: NURSE PRACTITIONER

## 2021-01-25 PROCEDURE — 3725F SCREEN DEPRESSION PERFORMED: CPT | Performed by: NURSE PRACTITIONER

## 2021-01-25 PROCEDURE — 99213 OFFICE O/P EST LOW 20 MIN: CPT | Performed by: NURSE PRACTITIONER

## 2021-01-25 PROCEDURE — 1036F TOBACCO NON-USER: CPT | Performed by: NURSE PRACTITIONER

## 2021-01-25 NOTE — ASSESSMENT & PLAN NOTE
Artemio Murray presents with a papular flesh colored rash on her face, torso, and bilateral arms  It started one week ago  There are some that have become excoriated however none look infected at this time  She states they do not bother her unless she takes a hot shower then they become itchy  She has used hydrocortisone cream on the rash as well as alcohol when they do itch  Consider guttate psoriasis vs lichen planus vs other  She was referred to dermatology for evaluation and treatment

## 2021-01-25 NOTE — PROGRESS NOTES
Assessment/Plan:    Rash of body  Tamia Pantoja presents with a papular flesh colored rash on her face, torso, and bilateral arms  It started one week ago  There are some that have become excoriated however none look infected at this time  She states they do not bother her unless she takes a hot shower then they become itchy  She has used hydrocortisone cream on the rash as well as alcohol when they do itch  Consider guttate psoriasis vs lichen planus vs other  She was referred to dermatology for evaluation and treatment  Diagnoses and all orders for this visit:    Rash of body  -     Ambulatory referral to Dermatology; Future      Flu shot refused  Aware she is due to schedule gyn exam/pap smear  Subjective:      Patient ID: Daly Eric is a 27 y o  female  Tamia Pantoja presents to the office for a follow up evaluation of a rash she was seen for this past Friday via telehealth  She states it started last Monday and believes it is relatied to a new coffee she started to consume  She stopped drinking the coffee this past 3 days and feels it has improved some  She states it starts as small flesh colored bumps that do not itch and then get bigger  Some are excoriated  She says they will itch with hot showers  She has also been using alcohol on the rash to "dry it out"  The rash is on her face, torso, arms and bilateral groin  She denies fever chills  The following portions of the patient's history were reviewed and updated as appropriate: allergies, current medications, past medical history, past social history, past surgical history and problem list     Review of Systems   Constitutional: Negative for chills and fever  HENT: Negative  Negative for sore throat  Respiratory: Negative for cough, chest tightness and shortness of breath  Cardiovascular: Negative for chest pain and palpitations  Gastrointestinal: Negative  Genitourinary: Negative  Musculoskeletal: Negative  Skin: Positive for rash  Neurological: Positive for headaches  Psychiatric/Behavioral: Negative  Objective:      /80   Pulse 82   Temp (!) 96 6 °F (35 9 °C)   Ht 5' 3" (1 6 m)   Wt 78 9 kg (174 lb)   BMI 30 82 kg/m²          Physical Exam  Vitals signs reviewed  Constitutional:       Appearance: She is obese  HENT:      Head: Normocephalic and atraumatic  Right Ear: Ear canal and external ear normal       Left Ear: Ear canal and external ear normal       Nose: Nose normal       Mouth/Throat:      Mouth: Mucous membranes are moist       Pharynx: Oropharynx is clear  Eyes:      Conjunctiva/sclera: Conjunctivae normal       Pupils: Pupils are equal, round, and reactive to light  Neck:      Musculoskeletal: Normal range of motion and neck supple  Cardiovascular:      Rate and Rhythm: Normal rate and regular rhythm  Pulses: Normal pulses  Heart sounds: Normal heart sounds  Pulmonary:      Effort: Pulmonary effort is normal       Breath sounds: Normal breath sounds  Musculoskeletal: Normal range of motion  Skin:     Capillary Refill: Capillary refill takes less than 2 seconds  Findings: Rash present  Rash is papular  Comments: Flesh colored, diffuse rash   Neurological:      General: No focal deficit present  Mental Status: She is alert and oriented to person, place, and time  Psychiatric:         Mood and Affect: Mood normal          Behavior: Behavior normal          Thought Content:  Thought content normal

## 2021-01-27 ENCOUNTER — TELEPHONE (OUTPATIENT)
Dept: FAMILY MEDICINE CLINIC | Facility: HOSPITAL | Age: 31
End: 2021-01-27

## 2021-01-27 DIAGNOSIS — R21 RASH OF BODY: Primary | ICD-10-CM

## 2021-01-27 RX ORDER — PREDNISONE 10 MG/1
TABLET ORAL
Qty: 16 TABLET | Refills: 0 | Status: SHIPPED | OUTPATIENT
Start: 2021-01-27 | End: 2021-03-30 | Stop reason: ALTCHOICE

## 2021-01-27 NOTE — TELEPHONE ENCOUNTER
It sounds from the visit like an allergic or sensitivity reaction, which Prednisone should be able to help and also to reduce any itching associated with it  I sent Rx Prednisone to use  If still a problem after this week I can see her for a visit

## 2021-01-27 NOTE — TELEPHONE ENCOUNTER
Pt very upset with us, said she cant see a dermatologist until months out or they are not taking new patients  She said she would like to speak with Dr Danyell Griggs about this issue  She would like to try an oral fungal cream or something until she can get seen by derm   Please advise

## 2021-01-27 NOTE — TELEPHONE ENCOUNTER
Patient had a telemed and an in office visit with [facility] recently for rash  Anything you are able to do?

## 2021-03-30 ENCOUNTER — OFFICE VISIT (OUTPATIENT)
Dept: FAMILY MEDICINE CLINIC | Facility: HOSPITAL | Age: 31
End: 2021-03-30
Payer: COMMERCIAL

## 2021-03-30 VITALS
HEIGHT: 64 IN | DIASTOLIC BLOOD PRESSURE: 82 MMHG | WEIGHT: 176.6 LBS | BODY MASS INDEX: 30.15 KG/M2 | TEMPERATURE: 98.4 F | SYSTOLIC BLOOD PRESSURE: 114 MMHG | HEART RATE: 90 BPM

## 2021-03-30 DIAGNOSIS — F32.81 PMDD (PREMENSTRUAL DYSPHORIC DISORDER): ICD-10-CM

## 2021-03-30 DIAGNOSIS — Z00.00 ANNUAL PHYSICAL EXAM: Primary | ICD-10-CM

## 2021-03-30 DIAGNOSIS — J30.1 SEASONAL ALLERGIC RHINITIS DUE TO POLLEN: ICD-10-CM

## 2021-03-30 PROBLEM — Z78.9 REFUSAL OF BLOOD PRODUCT: Status: ACTIVE | Noted: 2018-01-26

## 2021-03-30 PROCEDURE — 99395 PREV VISIT EST AGE 18-39: CPT | Performed by: FAMILY MEDICINE

## 2021-03-30 PROCEDURE — 3008F BODY MASS INDEX DOCD: CPT | Performed by: FAMILY MEDICINE

## 2021-03-30 PROCEDURE — 1036F TOBACCO NON-USER: CPT | Performed by: FAMILY MEDICINE

## 2021-03-30 RX ORDER — LORATADINE 10 MG/1
10 TABLET ORAL DAILY
Qty: 30 TABLET | Refills: 3 | Status: SHIPPED | OUTPATIENT
Start: 2021-03-30

## 2021-03-30 RX ORDER — PAROXETINE 10 MG/1
TABLET, FILM COATED ORAL
Qty: 24 TABLET | Refills: 1 | Status: SHIPPED | OUTPATIENT
Start: 2021-03-30

## 2021-03-30 RX ORDER — LORATADINE 10 MG/1
10 TABLET ORAL DAILY
COMMUNITY
End: 2021-03-30 | Stop reason: SDUPTHER

## 2021-03-30 NOTE — PATIENT INSTRUCTIONS

## 2021-03-30 NOTE — PROGRESS NOTES
Adama Greenberg MD    NAME: Daron Donahue  AGE: 32 y o  SEX: female  : 1990     DATE: 3/30/2021     Assessment and Plan:     Problem List Items Addressed This Visit        Respiratory    Allergic rhinitis    Relevant Medications    loratadine (CLARITIN) 10 mg tablet      Other Visit Diagnoses     Annual physical exam    -  Primary    PMDD (premenstrual dysphoric disorder)        Relevant Medications    PARoxetine (PAXIL) 10 mg tablet          Immunizations and preventive care screenings were discussed with patient today  Appropriate education was printed on patient's after visit summary  Counseling:  Dental Health: discussed importance of regular tooth brushing, flossing, and dental visits  · Exercise: the importance of regular exercise/physical activity was discussed  Recommend exercise 3-5 times per week for at least 30 minutes  BMI Counseling: Body mass index is 30 31 kg/m²  The BMI is above normal  Nutrition recommendations include decreasing portion sizes, moderation in carbohydrate intake and reducing intake of cholesterol  Exercise recommendations include vigorous physical activity 75 minutes/week  No pharmacotherapy was ordered  No follow-ups on file  Chief Complaint:     Chief Complaint   Patient presents with    Annual Exam      History of Present Illness:     Adult Annual Physical   Patient here for a comprehensive physical exam  The patient reports problems - PMDD  Diet and Physical Activity  · Diet/Nutrition: well balanced diet  · Exercise: moderate cardiovascular exercise  Depression Screening  PHQ-9 Depression Screening    PHQ-9:   Frequency of the following problems over the past two weeks:           General Health  · Sleep: sleeps well  · Hearing: normal - bilateral   · Vision: no vision problems  · Dental: regular dental visits and brushes teeth twice daily         /GYN Health  · Last menstrual period:   · Contraceptive method: none  · History of STDs?: no      Review of Systems:     Review of Systems   Constitutional: Positive for fatigue and unexpected weight change  HENT: Negative  Eyes: Negative for visual disturbance  Respiratory: Negative  Cardiovascular: Negative  Gastrointestinal: Negative  Genitourinary: Positive for menstrual problem  Musculoskeletal: Negative  Neurological: Negative  Hematological: Negative  Psychiatric/Behavioral: Negative  All other systems reviewed and are negative  Past Medical History:     Past Medical History:   Diagnosis Date    Paresthesia     last assessed 4/29/16      Past Surgical History:     History reviewed  No pertinent surgical history     Social History:        Social History     Socioeconomic History    Marital status: /Civil Union     Spouse name: None    Number of children: 1    Years of education: None    Highest education level: None   Occupational History    Occupation:    Social Needs    Financial resource strain: None    Food insecurity     Worry: None     Inability: None    Transportation needs     Medical: None     Non-medical: None   Tobacco Use    Smoking status: Never Smoker    Smokeless tobacco: Never Used   Substance and Sexual Activity    Alcohol use: None    Drug use: None    Sexual activity: None   Lifestyle    Physical activity     Days per week: None     Minutes per session: None    Stress: None   Relationships    Social connections     Talks on phone: None     Gets together: None     Attends [employer] service: None     Active member of club or organization: None     Attends meetings of clubs or organizations: None     Relationship status: None    Intimate partner violence     Fear of current or ex partner: None     Emotionally abused: None     Physically abused: None     Forced sexual activity: None   Other Topics Concern    None   Social History Narrative Caffeine use; coffee      Family History:     Family History   Problem Relation Age of Onset    Hypertension Mother     Hypertension Father       Current Medications:     Current Outpatient Medications   Medication Sig Dispense Refill    loratadine (CLARITIN) 10 mg tablet Take 1 tablet (10 mg total) by mouth daily 30 tablet 3    PARoxetine (PAXIL) 10 mg tablet Take 1 tablet for 4 days around the beginning of menstrual cycle 24 tablet 1     No current facility-administered medications for this visit  Allergies:     No Known Allergies   Physical Exam:     /82   Pulse 90   Temp 98 4 °F (36 9 °C)   Ht 5' 4" (1 626 m)   Wt 80 1 kg (176 lb 9 6 oz)   BMI 30 31 kg/m²     Physical Exam  Vitals signs and nursing note reviewed  Constitutional:       Appearance: Normal appearance  HENT:      Head: Normocephalic and atraumatic  Right Ear: Tympanic membrane, ear canal and external ear normal       Left Ear: Tympanic membrane, ear canal and external ear normal       Nose: Nose normal    Eyes:      Extraocular Movements: Extraocular movements intact  Pupils: Pupils are equal, round, and reactive to light  Neck:      Vascular: No carotid bruit  Cardiovascular:      Rate and Rhythm: Normal rate and regular rhythm  Pulses: Normal pulses  Heart sounds: Normal heart sounds  Pulmonary:      Effort: Pulmonary effort is normal       Breath sounds: Normal breath sounds  Abdominal:      General: Abdomen is flat  Palpations: Abdomen is soft  Musculoskeletal:      Right lower leg: No edema  Left lower leg: No edema  Lymphadenopathy:      Cervical: No cervical adenopathy  Skin:     Findings: No rash  Neurological:      General: No focal deficit present  Mental Status: She is alert and oriented to person, place, and time  Psychiatric:         Mood and Affect: Mood normal          Behavior: Behavior normal          Thought Content:  Thought content normal  Judgment: Judgment normal           MD Wilmar Ty MD

## 2024-10-04 ENCOUNTER — RA CDI HCC (OUTPATIENT)
Dept: OTHER | Facility: HOSPITAL | Age: 34
End: 2024-10-04